# Patient Record
(demographics unavailable — no encounter records)

---

## 2025-02-05 NOTE — INTERPRETER SERVICES
[Language Line ] : provided by Language Line   [Time Spent: ____ minutes] : Total time spent using  services: [unfilled] minutes. The patient's primary language is not English thus required  services. [Interpreters_IDNumber] : [id number] [Interpreters_FullName] : Colleen [TWNoteComboBox1] : Montserratian

## 2025-02-05 NOTE — ASSESSMENT
[FreeTextEntry1] : 48 YOF with no medical history and no prescription medication here to establish care with new PCP for +headaches, +vision changes, +forgetfulness, +hot/cold sensations x 1 month.   Lab script given to patient for possible etiologies for the above issues Will refer to optometrists for vision exam Script given for mammo and gynecological referral Follow up in 2 weeks

## 2025-02-05 NOTE — REVIEW OF SYSTEMS
[Negative] : Heme/Lymph [Vision Problems] : vision problems [Headache] : headache [Memory Loss] : memory loss
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
## 2025-02-05 NOTE — PHYSICAL EXAM
[Normal] : normal rate, regular rhythm, normal S1 and S2 and no murmur heard [Normal Sclera/Conjunctiva] : normal sclera/conjunctiva [PERRL] : pupils equal round and reactive to light [EOMI] : extraocular movements intact [Normal Outer Ear/Nose] : the outer ears and nose were normal in appearance [Normal Oropharynx] : the oropharynx was normal [Coordination Grossly Intact] : coordination grossly intact [No Focal Deficits] : no focal deficits [Normal Gait] : normal gait [Normal Affect] : the affect was normal [Alert and Oriented x3] : oriented to person, place, and time [Normal Insight/Judgement] : insight and judgment were intact

## 2025-02-12 NOTE — HISTORY OF PRESENT ILLNESS
[FreeTextEntry1] : 47 yo in for annual exam  Denies breast pain, breast mass, or nipple dc Denies abdominal pain or frequent constipation or blood in stools  Denies pelvic pain or vaginal bleeding  Patient is postmenopausal  occasional tobacco and alcohol use

## 2025-02-12 NOTE — PHYSICAL EXAM
[Chaperone Present] : A chaperone was present in the examining room during all aspects of the physical examination [[id number]] : A chaperone was present during the pelvic exam. [FreeTextEntry2] : Naz [Appropriately responsive] : appropriately responsive [Alert] : alert [No Acute Distress] : no acute distress [No Lymphadenopathy] : no lymphadenopathy [Regular Rate Rhythm] : regular rate rhythm [No Murmurs] : no murmurs [Clear to Auscultation B/L] : clear to auscultation bilaterally [Soft] : soft [Non-tender] : non-tender [Non-distended] : non-distended [No HSM] : No HSM [No Lesions] : no lesions [No Mass] : no mass [Oriented x3] : oriented x3 [Examination Of The Breasts] : a normal appearance [No Masses] : no breast masses were palpable [Labia Majora] : normal [Labia Minora] : normal [Normal] : normal [Uterine Adnexae] : normal

## 2025-02-19 NOTE — HISTORY OF PRESENT ILLNESS
[FreeTextEntry1] : pt presents for follow up from last visit  id [id number]  [de-identified] : NAIDA CACERES is a 48 year female who presents today Feb 19, 2025 to go over blood work results.

## 2025-02-19 NOTE — ASSESSMENT
[FreeTextEntry1] : I reviewed recent blood work results. She is post menopausal. She has slightly elevated ALT most likely due to fatty liver disease. LDL slightly elevated at 124. Thyroid peroxidase antibiotics elevated, but TSH and T4 normal. Ferritin elevated at 237. She has appt tomorrow with neurology for headaches, vision changes, forgetfulness. [facility]

## 2025-02-19 NOTE — INTERPRETER SERVICES
[Language Line ] : provided by Language Line   [Interpreters_IDNumber] : [id number]  [Interpreters_FullName] : keith [TWNoteComboBox1] : Ugandan

## 2025-02-19 NOTE — REVIEW OF SYSTEMS
[Vision Problems] : vision problems [Headache] : headache [Depression] : depression [de-identified] : +forgetfulness

## 2025-02-20 NOTE — REASON FOR VISIT
[Initial Evaluation] : an initial evaluation [FreeTextEntry1] : headaches [Interpreters_IDNumber] : [id number] [Interpreters_FullName] : Misael [TWNoteComboBox1] : Tunisian

## 2025-02-20 NOTE — PHYSICAL EXAM
[FreeTextEntry1] : GENERAL PHYSICAL EXAM: GEN: no acute distress, normal affect EYES:  sclera white, conjunctiva clear PULM: no respiratory distress, normal rate EXT: no edema, no cyanosis Cranial:  no supraorbital, temporal, or occipital region tenderness. No popping or clicking of TMJ Neck: Normal ROM, no muscle spasm, tenderness SKIN: warm, dry, no rash or lesion on exposed skin   NEUROLOGICAL EXAM: Mental Status Orientation: alert and oriented to person, place, time, and situation Language: clear and fluent, intact comprehension and repetition   Cranial Nerves II: visual fields full to confrontation III, IV, VI:  PERRL, EOMI, VFF, no nystagmus, no ptosis V, VII: facial sensation and movement intact and symmetric VIII: hearing intact to finger rub bilat IX, X: uvula midline, soft palate elevates normally XI: BL shoulder shrug intact, lateral head movement 5/5 bilat XII: tongue midline   Motor: Bilateral muscle strength 5/5 in UE and LE, proximally and distally, symmetric throughout Tone and bulk are normal in upper and lower limbs. No abnormal movements   Sensation: Intact to light touch and temp in all 4 EXTs,   Coordination: Normal FNF bilateral, Negative Romberg   Reflex: 2+ in BL biceps, brachioradialis, triceps, patella, Achilles. Babinski absent bilat   Gait Normal stance, stride, and pivot turn, Tandem walk intact
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
## 2025-02-20 NOTE — HISTORY OF PRESENT ILLNESS
[FreeTextEntry1] : HPI: 48 year old female with pmhx of fatty liver, HLD, head trauma presents for headaches and memory changes. She noticed she started having issues with her memory for roughly one year, thought it was related to lack of sleep, anxiety, or depression. She is only able to sleep for a few hours before she wakes up, feels unable to fall back asleep. She states she has been told she snores before and she experiences daytime somnolence as well.  She describes these memory issues as episodes lasting 2 to 3 hours where she attempts to remember what errands she needs to run and is unable to.  She then attempts to try to remember an unrelated topic such as events that transpired the day prior and is also unable to during this time.  She states that sometimes during these episodes she sees "little stars "or "little black dots" in her vision.  She is able to speak during these episodes and is aware of what is going on. Sometimes she can go 2 days without an event but she states this happens almost every day.   Headaches:  HAs began: 5 months ago Location: temples, forehead Quality: pulsing, strong Severity: 8/10 Disability: Duration: a few hours Frequency: daily Temporal course: Time of day: any Pain Free between Attacks: yes Triggers: none Relieving factors: dark, quiet Exacerbating factors: sound, light Prodrome or postdrome: none  Aura: none  Ass'd Symptoms: Nausea - Vomiting - Photophobia +  Phonophobia: + Osmophobia +  brain fog/difficulty concentrating - Irritability+ Allodynia + Blurred Vision + Neck pain + Dizziness -  Autonomic features:   Additional Social/Work History: Occupation: not working Habits: Caffeine: not drinking now, used to drink 2-3 cups/day ETOH: no Tobacco: no  Drugs: no Exercise: no Diet: sometimes skips a meal.  hydration: 5-6 small bottles Ophthalmologic: no Sleep:  Dental:- Sinus/Allergies: - Head Trauma: Hypermobility:  Psychiatric:   Treatment present: Acute: tylenol, sometimes works Preventive:

## 2025-03-10 NOTE — PLAN
[FreeTextEntry1] : left axillary abscess,  left breast dense at 12oclock and abscess in left axillary region mupirocin tid x 10 days warm compresses as much as possible doxycycline 100mg po bid x 7days take w/ food  us breasts  Acute pharyngitis  -Rapid strep neg -Throat cx sent -Hot tea w/ honey, warm salt water gargles, increase fluids rapid flu neg  rapid covid neg   c/o lump on left lower back  US  b/l leg pain /calf tenderness r/o dvt, no erythema or edema or warmth b/l  venous dopplers stat  advised if patient doesnt hear from me 1 week after testing to call office for results , patient agreed w/plan and understood.   f/u if no relief pt agreed w/plan

## 2025-03-10 NOTE — HISTORY OF PRESENT ILLNESS
[FreeTextEntry8] : Patient presents as having a bump under left armpit that hurts a lot, and patient is also getting fevers. Patient felt the bump 3 days ago, and fevers started last night. she didnt check her temperature +Chills she took tylenol with some relief she didnt use warm compresses  c/o bone pain w/ whole body aches whichbegan yesterday +sore throat, nasal congestion and cough   * #[id number]

## 2025-03-10 NOTE — PHYSICAL EXAM
[No Lymphadenopathy] : no lymphadenopathy [Supple] : supple [Normal] : normal rate, regular rhythm, normal S1 and S2 and no murmur heard [No Edema] : there was no peripheral edema [No Focal Deficits] : no focal deficits [Normal Affect] : the affect was normal [Normal Mood] : the mood was normal [Normal Insight/Judgement] : insight and judgment were intact [Normal Appearance] : normal in appearance [No Nipple Discharge] : no nipple discharge [de-identified] : b/l  calf tenderness b/l LE [de-identified] : left axillary abscess,  left breast dense at 12oclock and abscess in left axillary region [de-identified] : left axilla w/ erythema and warmth and a royal, c/o lump on left lower back  with very small palpable lump not visible, no edema or erythema or warmth

## 2025-03-10 NOTE — PHYSICAL EXAM
[No Lymphadenopathy] : no lymphadenopathy [Supple] : supple [Normal] : normal rate, regular rhythm, normal S1 and S2 and no murmur heard [No Edema] : there was no peripheral edema [No Focal Deficits] : no focal deficits [Normal Affect] : the affect was normal [Normal Mood] : the mood was normal [Normal Insight/Judgement] : insight and judgment were intact [Normal Appearance] : normal in appearance [No Nipple Discharge] : no nipple discharge [de-identified] : b/l  calf tenderness b/l LE [de-identified] : left axillary abscess,  left breast dense at 12oclock and abscess in left axillary region [de-identified] : left axilla w/ erythema and warmth and a royal, c/o lump on left lower back  with very small palpable lump not visible, no edema or erythema or warmth

## 2025-03-10 NOTE — REVIEW OF SYSTEMS
[Chills] : chills [Nasal Discharge] : nasal discharge [Sore Throat] : sore throat [Cough] : cough [FreeTextEntry2] : felt feverish

## 2025-03-10 NOTE — HISTORY OF PRESENT ILLNESS
[FreeTextEntry8] : Patient presents as having a bump under left armpit that hurts a lot, and patient is also getting fevers. Patient felt the bump 3 days ago, and fevers started last night. she didnt check her temperature +Chills she took tylenol with some relief she didnt use warm compresses  c/o bone pain w/ whole body aches whichbegan yesterday +sore throat, nasal congestion and cough   * #[id number]

## 2025-03-24 NOTE — PLAN
[FreeTextEntry1] : left axillary abscess,  left breast dense at 12oclock and abscess in left axillary region resolved  s/p mupirocin tid x 10 days s/p warm compresses as much as possible s/p doxycycline 100mg po bid x 7days take w/ food  us breasts wasnt done advised to do so   Acute pharyngitis  resolved  -Rapid strep neg last visit  -Throat cx sent last visit  -Hot tea w/ honey, warm salt water gargles, increase fluids rapid flu neg last visit  rapid covid neg last visit   c/o lump on left lower back  US wasnt done advised to do so   b/l leg pain /calf tenderness r/o dvt, no erythema or edema or warmth b/l  venous dopplers stat wasnt done advised to do so   advised she did not complete the studies as they are not in the [facility] or  portals she said she went to [facility] but i advised there are no results for all 3 tests  she was asking for mri brain result ordered by neurologist, advised since they ordered the test she has to speak to them about it  advised if patient doesnt hear from me 1 week after testing to call office for results , patient agreed w/plan and understood. f/u 2-3 weeks pt agreed w/plan   f/u if no relief pt agreed w/plan

## 2025-03-24 NOTE — PHYSICAL EXAM
[No Lymphadenopathy] : no lymphadenopathy [Supple] : supple [Normal] : normal rate, regular rhythm, normal S1 and S2 and no murmur heard [No Edema] : there was no peripheral edema [Normal Appearance] : normal in appearance [No Nipple Discharge] : no nipple discharge [No Focal Deficits] : no focal deficits [Normal Affect] : the affect was normal [Normal Mood] : the mood was normal [Normal Insight/Judgement] : insight and judgment were intact [de-identified] : b/l  calf tenderness b/l LE [de-identified] : left axillary abscess resolved  [de-identified] : left axilla w/ erythema and warmth and a royal, c/o lump on left lower back  with very small palpable lump not visible, no edema or erythema or warmth

## 2025-03-24 NOTE — PLAN
[FreeTextEntry1] : left axillary abscess,  left breast dense at 12oclock and abscess in left axillary region resolved  s/p mupirocin tid x 10 days s/p warm compresses as much as possible s/p doxycycline 100mg po bid x 7days take w/ food  us breasts wasnt done advised to do so   Acute pharyngitis  resolved  -Rapid strep neg last visit  -Throat cx sent last visit  -Hot tea w/ honey, warm salt water gargles, increase fluids rapid flu neg last visit  rapid covid neg last visit   c/o lump on left lower back  US wasnt done advised to do so   b/l leg pain /calf tenderness r/o dvt, no erythema or edema or warmth b/l  venous dopplers stat wasnt done advised to do so   advised she did not complete the studies as they are not in the [facility] or  portals she said she went to [facility] but i advised there are no results for all 3 tests  she was asking for mri brain result ordered by neurologist, advised since they ordered the test she has to speak to them about it  advised if patient doesnt hear from me 1 week after testing to call office for results , patient agreed w/plan and understood. f/u 2-3 weeks pt agreed w/plan   f/u if no relief pt agreed w/plan

## 2025-03-24 NOTE — HISTORY OF PRESENT ILLNESS
[FreeTextEntry1] :  # [id number]   Patient presents as follow up to go over test results that patient recently had done and also would like to know if big toenails can be removed patient is having issues with them and thinks may have fungus for a few years now. [FreeTextEntry8] :  # [id number]   Patient presents as follow up to go over test results that patient recently had done and also would like to know if big toenails can be removed patient is having issues with them and thinks may have fungus for a few years now. lump under armpit resolved she said she felt warm earlier and didnt take her temp but took tylenol and she felt better

## 2025-03-24 NOTE — PHYSICAL EXAM
[No Lymphadenopathy] : no lymphadenopathy [Supple] : supple [Normal] : normal rate, regular rhythm, normal S1 and S2 and no murmur heard [No Edema] : there was no peripheral edema [Normal Appearance] : normal in appearance [No Nipple Discharge] : no nipple discharge [No Focal Deficits] : no focal deficits [Normal Affect] : the affect was normal [Normal Mood] : the mood was normal [Normal Insight/Judgement] : insight and judgment were intact [de-identified] : b/l  calf tenderness b/l LE [de-identified] : left axillary abscess resolved  [de-identified] : left axilla w/ erythema and warmth and a royal, c/o lump on left lower back  with very small palpable lump not visible, no edema or erythema or warmth

## 2025-03-24 NOTE — HISTORY OF PRESENT ILLNESS
[FreeTextEntry1] :  # [id number]   Patient presents as follow up to go over test results that patient recently had done and also would like to know if big toenails can be removed patient is having issues with them and thinks may have fungus for a few years now. [FreeTextEntry8] :  # [id number]   Patient presents as follow up to go over test results that patient recently had done and also would like to know if big toenails can be removed patient is having issues with them and thinks may have fungus for a few years now. lump under armpit resolved she said she felt warm earlier and didnt take her temp but took tylenol and she felt better
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
## 2025-04-03 NOTE — HISTORY OF PRESENT ILLNESS
[FreeTextEntry1] : 4/3: Since last visit she is still having headaches once every 3 days, thinks frequency has gone down as she is sleeping a little bit better, approximately 6 hours per night.  She was unable to  her rizatriptan due to a pharmacy issue.  She also was unable to complete her home sleep study as she was unable to operate the machine at the center. She is still having the episodes of going "blank" and not being able to remember things, it happens roughly twice per day which is the same from last visit. She still experiences the black dots or stars in her vision. She states that sometimes she feels extra fatigued after these episodes, no confusion. She is aware of what is going on during these events but is unable to remember anything during that time so if someone asks her what she did earlier that day she cannot give an answer. She has an appointment with psych next week for her anxiety and depression.   Sometimes feels heart racing, not sure if it is during these events or not. Denies dizziness, numbness, vision, hearing issues/tinnitus, HA, CP, SOB, focal weakness, change in bowel, bladder habits, trauma, LOC, fever, chills. -------------------------------------------- <<< *** BACKGROUND *** >>> -------------------------------------------- HPI: 48 year old female with pmhx of fatty liver, HLD, head trauma presents for headaches and memory changes. She noticed she started having issues with her memory for roughly one year, thought it was related to lack of sleep, anxiety, or depression. She is only able to sleep for a few hours before she wakes up, feels unable to fall back asleep. She states she has been told she snores before and she experiences daytime somnolence as well.  She describes these memory issues as episodes lasting 2 to 3 hours where she attempts to remember what errands she needs to run and is unable to.  She then attempts to try to remember an unrelated topic such as events that transpired the day prior and is also unable to during this time.  She states that sometimes during these episodes she sees "little stars "or "little black dots" in her vision.  She is able to speak during these episodes and is aware of what is going on. Sometimes she can go 2 days without an event but she states this happens almost every day.   Headaches:  HAs began: 5 months ago Location: temples, forehead Quality: pulsing, strong Severity: 8/10 Disability: Duration: a few hours Frequency: daily Temporal course: Time of day: any Pain Free between Attacks: yes Triggers: none Relieving factors: dark, quiet Exacerbating factors: sound, light Prodrome or postdrome: none  Aura: none  Ass'd Symptoms: Nausea - Vomiting - Photophobia +  Phonophobia: + Osmophobia +  brain fog/difficulty concentrating - Irritability+ Allodynia + Blurred Vision + Neck pain + Dizziness -  Autonomic features:   Additional Social/Work History: Occupation: not working Habits: Caffeine: not drinking now, used to drink 2-3 cups/day ETOH: no Tobacco: no  Drugs: no Exercise: no Diet: sometimes skips a meal.  hydration: 5-6 small bottles Ophthalmologic: no Sleep:  Dental:- Sinus/Allergies: - Head Trauma: Hypermobility:  Psychiatric:   Treatment present: Acute: tylenol, sometimes works Preventive:

## 2025-04-03 NOTE — REASON FOR VISIT
[Follow-Up: _____] : a [unfilled] follow-up visit [Interpreters_IDNumber] : [id number] [Interpreters_FullName] : Fuad [TWNoteComboBox1] : Malaysian

## 2025-04-03 NOTE — PHYSICAL EXAM
[FreeTextEntry1] : GENERAL PHYSICAL EXAM: GEN: no acute distress, normal affect EYES:  sclera white, conjunctiva clear PULM: no respiratory distress, normal rate EXT: no edema, no cyanosis Cranial:  no supraorbital, temporal, or occipital region tenderness. No popping or clicking of TMJ Neck: Normal ROM, no muscle spasm, tenderness SKIN: warm, dry, no rash or lesion on exposed skin   NEUROLOGICAL EXAM: Mental Status Orientation: alert and oriented to person, place, time, and situation Language: clear and fluent, intact comprehension and repetition   Cranial Nerves II: visual fields full to confrontation III, IV, VI:  PERRL, EOMI, VFF, no nystagmus, no ptosis V, VII: facial sensation and movement intact and symmetric VIII: hearing intact to finger rub bilat IX, X: uvula midline, soft palate elevates normally XI: BL shoulder shrug intact, lateral head movement 5/5 bilat XII: tongue midline   Motor: Bilateral muscle strength 5/5 in UE and LE, proximally and distally, symmetric throughout Tone and bulk are normal in upper and lower limbs. No abnormal movements   Sensation: Intact to light touch and temp in all 4 EXTs,   Coordination: Normal FNF bilateral, Negative Romberg   Reflex: 2+ in BL biceps, brachioradialis, triceps, patella, Achilles.   Gait Normal stance, stride, and pivot turn, Tandem walk intact
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
## 2025-04-18 NOTE — ASSESSMENT
[FreeTextEntry1] : Obesity BMI 34. Insurance does not cover weight loss medication. Recommend diet and exercise  Migraines Continue rizatriptan as per psych   The patient has a moderate to high pre-test probability of Obstructive Sleep Apnea. This assessment is based on a high STOP-BANG score, medical history and clinical examination. As a result, diagnostic polysomnography is recommended to determine the presence and severity of this disorder.
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
## 2025-06-04 NOTE — REASON FOR VISIT
[FreeTextEntry1] : A synchronous bidirectional video and audio consultation was conducted today with the patient located in patient's home and this provider located at the [facility] at [address] [place] [address] [Interpreters_IDNumber] : [id number] [Interpreters_FullName] : Virgilio [TWNoteComboBox1] : Jamaican

## 2025-06-04 NOTE — HISTORY OF PRESENT ILLNESS
[FreeTextEntry1] : 6/2: Her headaches are still occurring once every 3 days however notes that the severity of headache has decreased since last visit.  She also notes continued improved sleep which she attributes to falling asleep much earlier than she was before.  She was not able to  the rizatriptan 10 mg from the pharmacy however feels that her headaches are now well-controlled with the change in sleep as well as Tylenol.  She did not see the sleep specialist as she felt that her sleep was improving on her own.  She also does not experience any more "blank" episodes that she is experiencing before.  She states that ever since she started sleeping better that these episodes of completely resolved.  She was able to establish care with a psychologist and states that they are currently working on her anxiety/depression.  They have not had many visits so far.  Denies dizziness, numbness, vision, hearing issues/tinnitus, CP, SOB, focal weakness, change in bowel, bladder habits, trauma, LOC, fever, chills. -------------------------------------------- <<< *** BACKGROUND *** >>> -------------------------------------------- 4/3: Since last visit she is still having headaches once every 3 days, thinks frequency has gone down as she is sleeping a little bit better, approximately 6 hours per night.  She was unable to  her rizatriptan due to a pharmacy issue.  She also was unable to complete her home sleep study as she was unable to operate the machine at the center. She is still having the episodes of going "blank" and not being able to remember things, it happens roughly twice per day which is the same from last visit. She still experiences the black dots or stars in her vision. She states that sometimes she feels extra fatigued after these episodes, no confusion. She is aware of what is going on during these events but is unable to remember anything during that time so if someone asks her what she did earlier that day she cannot give an answer. She has an appointment with psych next week for her anxiety and depression.   Sometimes feels heart racing, not sure if it is during these events or not. Denies dizziness, numbness, vision, hearing issues/tinnitus, HA, CP, SOB, focal weakness, change in bowel, bladder habits, trauma, LOC, fever, chills.  HPI: 48 year old female with pmhx of fatty liver, HLD, head trauma presents for headaches and memory changes. She noticed she started having issues with her memory for roughly one year, thought it was related to lack of sleep, anxiety, or depression. She is only able to sleep for a few hours before she wakes up, feels unable to fall back asleep. She states she has been told she snores before and she experiences daytime somnolence as well.  She describes these memory issues as episodes lasting 2 to 3 hours where she attempts to remember what errands she needs to run and is unable to.  She then attempts to try to remember an unrelated topic such as events that transpired the day prior and is also unable to during this time.  She states that sometimes during these episodes she sees "little stars "or "little black dots" in her vision.  She is able to speak during these episodes and is aware of what is going on. Sometimes she can go 2 days without an event but she states this happens almost every day.   Headaches:  HAs began: 5 months ago Location: temples, forehead Quality: pulsing, strong Severity: 8/10 Disability: Duration: a few hours Frequency: daily Temporal course: Time of day: any Pain Free between Attacks: yes Triggers: none Relieving factors: dark, quiet Exacerbating factors: sound, light Prodrome or postdrome: none  Aura: none  Ass'd Symptoms: Nausea - Vomiting - Photophobia +  Phonophobia: + Osmophobia +  brain fog/difficulty concentrating - Irritability+ Allodynia + Blurred Vision + Neck pain + Dizziness -  Autonomic features:   Additional Social/Work History: Occupation: not working Habits: Caffeine: not drinking now, used to drink 2-3 cups/day ETOH: no Tobacco: no Drugs: no Exercise: no Diet: sometimes skips a meal.  hydration: 5-6 small bottles Ophthalmologic: no Sleep:  Dental:- Sinus/Allergies: - Head Trauma: Hypermobility:  Psychiatric:   Treatment present: Acute: tylenol, sometimes works Preventive:

## 2025-06-04 NOTE — PHYSICAL EXAM
[FreeTextEntry1] : telehealth video exam - limited exam  GENERAL APPEARANCE: Well developed, well nourished woman in no acute distress.  NEUROLOGIC EXAM: MENTAL STATUS: Alert and Oriented to person, place and time. Speech is fluent, without aphasia or dysarthria.  Able to name, repeat and follow commands.  Behavior and affect appropriate to situation.                        CRANIAL NERVES: CN 2:    Visual fields appear full to confrontation OU CN 3, 4, 6: Extraocular movements are intact. No nystagmus or ophthalmoplegia is evident. Pupils are equally round CN 7:     Facial excursion is full and symmetric bilaterally. MOTOR: Bilateral upper extremities are antigravity without orbiting or drift.  bilateral lower extremities are full range of motion without drift.  COORD: Finger to nose testing without dysmetria bilaterally.

## 2025-06-04 NOTE — REASON FOR VISIT
[FreeTextEntry1] : A synchronous bidirectional video and audio consultation was conducted today with the patient located in patient's home and this provider located at the [facility] at [address] [place] [address] [Interpreters_IDNumber] : [id number] [Interpreters_FullName] : Virgilio [TWNoteComboBox1] : Grenadian